# Patient Record
Sex: FEMALE | Race: WHITE | NOT HISPANIC OR LATINO | Employment: FULL TIME | URBAN - METROPOLITAN AREA
[De-identification: names, ages, dates, MRNs, and addresses within clinical notes are randomized per-mention and may not be internally consistent; named-entity substitution may affect disease eponyms.]

---

## 2024-09-30 ENCOUNTER — HOSPITAL ENCOUNTER (OUTPATIENT)
Dept: RADIOLOGY | Facility: HOSPITAL | Age: 64
Discharge: HOME | End: 2024-09-30
Payer: COMMERCIAL

## 2024-09-30 ENCOUNTER — OFFICE VISIT (OUTPATIENT)
Dept: ORTHOPEDIC SURGERY | Facility: HOSPITAL | Age: 64
End: 2024-09-30
Payer: COMMERCIAL

## 2024-09-30 VITALS — BODY MASS INDEX: 39.25 KG/M2 | HEIGHT: 67 IN | WEIGHT: 250.1 LBS

## 2024-09-30 DIAGNOSIS — M25.562 LEFT KNEE PAIN, UNSPECIFIED CHRONICITY: ICD-10-CM

## 2024-09-30 DIAGNOSIS — M17.12 PRIMARY OSTEOARTHRITIS OF LEFT KNEE: Primary | ICD-10-CM

## 2024-09-30 PROCEDURE — 2500000004 HC RX 250 GENERAL PHARMACY W/ HCPCS (ALT 636 FOR OP/ED): Performed by: STUDENT IN AN ORGANIZED HEALTH CARE EDUCATION/TRAINING PROGRAM

## 2024-09-30 PROCEDURE — 20610 DRAIN/INJ JOINT/BURSA W/O US: CPT | Mod: LT | Performed by: STUDENT IN AN ORGANIZED HEALTH CARE EDUCATION/TRAINING PROGRAM

## 2024-09-30 PROCEDURE — 99214 OFFICE O/P EST MOD 30 MIN: CPT | Performed by: STUDENT IN AN ORGANIZED HEALTH CARE EDUCATION/TRAINING PROGRAM

## 2024-09-30 PROCEDURE — 2500000005 HC RX 250 GENERAL PHARMACY W/O HCPCS: Performed by: STUDENT IN AN ORGANIZED HEALTH CARE EDUCATION/TRAINING PROGRAM

## 2024-09-30 PROCEDURE — 73564 X-RAY EXAM KNEE 4 OR MORE: CPT | Mod: LT

## 2024-09-30 PROCEDURE — 1036F TOBACCO NON-USER: CPT | Performed by: STUDENT IN AN ORGANIZED HEALTH CARE EDUCATION/TRAINING PROGRAM

## 2024-09-30 PROCEDURE — 3008F BODY MASS INDEX DOCD: CPT | Performed by: STUDENT IN AN ORGANIZED HEALTH CARE EDUCATION/TRAINING PROGRAM

## 2024-09-30 PROCEDURE — 1159F MED LIST DOCD IN RCRD: CPT | Performed by: STUDENT IN AN ORGANIZED HEALTH CARE EDUCATION/TRAINING PROGRAM

## 2024-09-30 RX ORDER — TRIAMCINOLONE ACETONIDE 40 MG/ML
80 INJECTION, SUSPENSION INTRA-ARTICULAR; INTRAMUSCULAR
Status: COMPLETED | OUTPATIENT
Start: 2024-09-30 | End: 2024-09-30

## 2024-09-30 RX ORDER — CHOLECALCIFEROL (VITAMIN D3) 25 MCG
1000 TABLET ORAL
COMMUNITY

## 2024-09-30 RX ORDER — BUPIVACAINE HYDROCHLORIDE 5 MG/ML
4 INJECTION, SOLUTION PERINEURAL
Status: COMPLETED | OUTPATIENT
Start: 2024-09-30 | End: 2024-09-30

## 2024-09-30 RX ORDER — LIDOCAINE HYDROCHLORIDE 10 MG/ML
8 INJECTION, SOLUTION INFILTRATION; PERINEURAL
Status: COMPLETED | OUTPATIENT
Start: 2024-09-30 | End: 2024-09-30

## 2024-09-30 ASSESSMENT — ENCOUNTER SYMPTOMS
DEPRESSION: 0
LOSS OF SENSATION IN FEET: 0
OCCASIONAL FEELINGS OF UNSTEADINESS: 0

## 2024-09-30 ASSESSMENT — PAIN DESCRIPTION - DESCRIPTORS: DESCRIPTORS: ACHING;THROBBING

## 2024-09-30 ASSESSMENT — PAIN - FUNCTIONAL ASSESSMENT: PAIN_FUNCTIONAL_ASSESSMENT: 0-10

## 2024-09-30 ASSESSMENT — PAIN SCALES - GENERAL: PAINLEVEL_OUTOF10: 5 - MODERATE PAIN

## 2024-09-30 NOTE — PROGRESS NOTES
Kalpana Cespedes MD   Adult Reconstruction and Joint Replacement Surgery  Phone: [phone]     Fax: [phone]       Name: Mary Coughlin  Age: 63 y.o.   : 1960   Date of Visit: 2024    INITIAL CONSULTATION    CC: Left knee pain    Clinical History:  This patient presents with several months of LEFT knee pain, acute worsened in the past 10 days. She reports intermittent diffuse knee pain and calf fullness without swelling that occurs when undergoing exercise that resolves with rest after hours or a day. This has occurred 3-4 times since this summer. This most recent event occurred 10 days ago, with unresolving pains since.    Patient has tried the following Ice, NSAIDs, Tylenol (arthritis dosing) , Activity modification, Physical therapy, Brace , and Xray. Patient does have pain at night. Patient does not report falls related to this problem. Patient is able to walk unlimited blocks. Patient is currently using crutches as assistive device. Primarily complains of diffuse pain. Patient has difficulty with climbing stairs, descending stairs, walking , getting up from a chair, prolonged sitting , and walking on unlevel surfaces . The pain is significantly impacting their ability to perform activities of daily living. Patient reports no longer able to do activities such as work, biking, going on walks.     Focused History  PMH: Reviewed and PE/DVT: none  PSH: Reviewed , Hip/Knee replacement: no, Hip/Knee surgery: yes, MPFL recon + lateral release R knee, Anesthesia complications: no, Spine surgery: no, Surgical infection: no, and Weight loss surgery: no  Meds: Reviewed, Current Anticoagulants: no, Weight loss medication: no, and Current Opioids: no  Allergies: Reviewed  and The patient reports no contraindications or allergies to cephalosporins, aspirin, NSAIDs or opioids, except as noted above.  FH: No family history of any bleeding or clotting disorders.  SHx: Reviewed, Occupation: fire place  store owner, Current smoker: no, EtOH intake weekly: 1-2 , Social support: , and Preferred physical activities: biking, walking  Dental Hx: Last routine cleanin year ago and Discussed that all invasive dental work must be completed at least 3 months prior to joint replacement surgery. Patient understands they are to avoid any invasive dental work 3-6 months post-surgically.   Jehovah´s Witness: no    PROMs/HISTORY  PROMs   No questionnaires on file.     Past Medical History:   Diagnosis Date    Personal history of other (healed) physical injury and trauma 2015    History of dislocation of knee       Past Medical History:   Diagnosis Date    Personal history of other (healed) physical injury and trauma 2015    History of dislocation of knee     Documented in chart and reviewed.     Past Surgical History:   Procedure Laterality Date    KNEE ARTHROSCOPY W/ DEBRIDEMENT  2016    Knee Arthroscopy (Therapeutic)       Allergies: She is allergic to prochlorperazine and sulfa (sulfonamide antibiotics).     Medications:  Current Outpatient Medications   Medication Instructions    cholecalciferol (VITAMIN D-3) 1,000 Units, oral, Daily RT       No family history on file.  Documented in chart and reviewed.     Social History     Tobacco Use    Smoking status: Former     Types: Cigarettes    Smokeless tobacco: Never   Substance Use Topics    Alcohol use: Not on file        Review of Systems: Review of systems completed with medical assistant intake. Please refer to this note.     Physical Exam:  BMI: 39.    General: The patient is well appearing and has an appropriate affect.     Neurological Examination: SILT in SPN/DPN/Sural/Saphenous/Tibial nerves. 5/5 EHL, FHL, Tibial anterior, Gastrocnemius. Coordination grossly intact.     Cardiovascular Exam: Capillary refill <2 seconds.     Lymphatic Examination: There is no obvious lymphatic swelling present around the involved joint.    Skin Exam: Skin around  the pertinent joint is without evidence of infection or rash.    Gait: The patient ambulates with an antalgic gait.     Right Hip Examination:  The skin is intact over the hip.    There is no tenderness over the greater trochanter.    Range of motion is full extension to 100 degrees of flexion.    The hip is stable without subluxation or dislocation.    The hip internally rotates to 15 degrees and externally rotates to 45 degrees.    There is no pain with hip motion.    Left Hip Examination:  The skin is intact over the hip.    There is no tenderness over the greater trochanter.    Range of motion is full extension to 100 degrees of flexion.    The hip is stable without subluxation or dislocation.    The hip internally rotates to 15 degrees and externally rotates to 45 degrees.    There is no pain with hip motion.    Left Knee Examination:  Examination of the left knee reveals the skin to be intact.    There is a moderate effusion in the knee.    The alignment of the knee is Varus.    This deformity is correctable.    There is tenderness to palpation over the joint line.    There is significant quadriceps atrophy.    Range of Motion: full extension to 115 degrees of flexion.    The knee is stable to varus-valgus stress and anterior-posterior stress.     There is mild grinding with range of motion.    There is moderate patellofemoral crepitus.    Patella tracks midline to slightly laterally deviated, but does not dislocate.  No J sign.  Patella translates 1-2 quadrants medial lateral without apprehension.  Minimal tenderness.  Positive patellar grind.    Right Knee Examination:  Examination of the right knee reveals the skin to be intact.     There is no obvious swelling.    There is a no effusion in the knee.     The alignment of the knee is normal.    There is no tenderness to palpation over the joint line.    There is no significant quadriceps atrophy.    Range of motion is full extension to 120 degrees of  "flexion.    The knee is stable to varus-valgus stress and anterior-posterior stress.     There is mild grinding with range of motion.    There is moderate patellofemoral crepitus.    Prior Labs:   Prior Labs:   No results found for: \"WBC\", \"HGB\", \"HCT\", \"MCV\", \"PLT\"   No results found for: \"INR\", \"PROTIME\"      No results found for: \"GLUCOSE\", \"CALCIUM\", \"NA\", \"K\", \"CO2\", \"CL\", \"BUN\", \"CREATININE\"   No results found for: \"CKTOTAL\", \"CKMB\", \"CKMBINDEX\", \"TROPONINI\"   No results found for: \"HGBA1C\"      No results found for: \"CRP\"   No results found for: \"SEDRATE\"      Radiographs:  Radiographs were personally reviewed today. There is evidence of severe LEFT knee osteoarthritis with near PATELLOFEMORAL bone on bone apposition.  Mild osteoarthritis involving the medial compartment.    Impression:  This patient presents with severe LEFT knee osteoarthritis with near bone on bone apposition. The patient is not a candidate for surgery at this time.    Diagnosis:  Primary osteoarthritis of left knee    Left knee pain, unspecified chronicity     Recommendations / Plan:    I have discussed the options in detail with the patient. We have discussed anti-inflammatory medication, activity modification, physical therapy, corticosteroid injections, viscosupplementation injections, partial knee replacement surgery and total knee replacement surgery. The patient has not yet exhausted all conservative treatment measures.    The risks and benefits of all these treatment options have been discussed in detail.     The patient has tried at least 3 months of the above conservative treatments and continues to have disabling pain, impaired activities of daily living and worsened quality of life.  Reviewed the surgical optimization steps to optimize their chances for a successful joint replacement surgery.      Currently their BMI is 39.  Discussed that obesity is a risk factor for continued progression of osteoarthritis. Each pound of " weight loss offloads their hip and knee joints by 3-6 pounds.  The most effective of these options is weight loss mainly through restricting caloric intake.     A physical therapy prescription was ordered for the patient.  Patient will continue their home exercise program. Strategies for pain management using over-the-counter anti-inflammatory medications reviewed -ibuprofen discussed.  The patient elects for a steroid injection, which was provided according to procedure note below.  Patellar band discussed.  Encouraged them to maintain range of motion and strength around the knee joints.  They will continue to implement these strategies in addressing their pain.      Recommend the patient continue optimizing nonsurgical treatment interventions as outlined above for management of their knee arthritis.  I would be happy to see them again at any point to discuss surgery if they are more optimized or to review progress with nonsurgical treatment of arthritis. The patient verbalizes understanding with the recommendations and treatment plan as outlined above and is in agreement.  Questions were addressed.    Large Joint Injection 20610: Knee  Consent given by: Patient  Timeout: Immediately prior to procedure the correct patient, procedure, and site was verified. Sterile gloves and semi-sterile technique were used.   Indications: Knee pain and joint swelling.   Location: LEFT knee  Needle size: 22 G  Approach: Lateral    Medications administered: please see medical assistant note for lot number and expiration date.   Subcutaneous   4 ml of 1% lidocaine     Deep   4 ml of 1% lidocaine   4 mL 0.5% marcaine   2 mL of 40 mg kenalog     Patient tolerance: Dressing applied. Patient tolerated the procedure well with no immediate complications.    L Inj/Asp: L knee on 9/30/2024 9:37 AM  Indications: pain and joint swelling  Details: 22 G needle, lateral approach  Medications: 80 mg triamcinolone acetonide 40 mg/mL; 8 mL lidocaine  10 mg/mL (1 %); 4 mL BUPivacaine HCl 0.5 % (5 mg/mL)  Outcome: tolerated well, no immediate complications  Procedure, treatment alternatives, risks and benefits explained, specific risks discussed. Consent was given by the patient. Immediately prior to procedure a time out was called to verify the correct patient, procedure, equipment, support staff and site/side marked as required. Patient was prepped and draped in the usual sterile fashion.         _____________  Kalpana Cespedes MD   Attending Orthopaedic Surgeon  [facility]    [facility]     This office note was transcribed with dictation software.  Please excuse any typographical errors, program misunderstandings leading to inadvertent insertions or deletions of inappropriate wording, pronoun errors and other unintentional transcription errors not noticed on proof-reading.

## 2024-10-14 ENCOUNTER — HOSPITAL ENCOUNTER (OUTPATIENT)
Dept: RADIOLOGY | Facility: CLINIC | Age: 64
Discharge: HOME | End: 2024-10-14
Payer: COMMERCIAL

## 2024-10-14 ENCOUNTER — TRANSCRIBE ORDERS (OUTPATIENT)
Dept: RADIOLOGY | Facility: CLINIC | Age: 64
End: 2024-10-14

## 2024-10-14 ENCOUNTER — APPOINTMENT (OUTPATIENT)
Dept: OBSTETRICS AND GYNECOLOGY | Facility: CLINIC | Age: 64
End: 2024-10-14
Payer: COMMERCIAL

## 2024-10-14 DIAGNOSIS — N95.0 PMB (POSTMENOPAUSAL BLEEDING): ICD-10-CM

## 2024-10-14 DIAGNOSIS — N95.0 PMB (POSTMENOPAUSAL BLEEDING): Primary | ICD-10-CM

## 2024-10-14 PROCEDURE — 1036F TOBACCO NON-USER: CPT | Performed by: OBSTETRICS & GYNECOLOGY

## 2024-10-14 PROCEDURE — 99204 OFFICE O/P NEW MOD 45 MIN: CPT | Performed by: OBSTETRICS & GYNECOLOGY

## 2024-10-14 PROCEDURE — 1159F MED LIST DOCD IN RCRD: CPT | Performed by: OBSTETRICS & GYNECOLOGY

## 2024-10-14 PROCEDURE — 76830 TRANSVAGINAL US NON-OB: CPT | Performed by: OBSTETRICS & GYNECOLOGY

## 2024-10-14 RX ORDER — MISOPROSTOL 200 UG/1
200 TABLET ORAL ONCE
Qty: 1 TABLET | Refills: 0 | Status: SHIPPED
Start: 2024-10-14 | End: 2024-10-15 | Stop reason: WASHOUT

## 2024-10-14 NOTE — PROGRESS NOTES
Pt c/o VB yesterday - 2 episodes  No recent intercourse  No prior h/o of PMB    Last pap 2016 normal  Last HPV 2016 negative    Last mammogram 2020 normal    PE  EGBUS normal  Vagina blood in vault  Cervix no lesions, stenotic    Unable to obtain EMB due to cervical stenosis    Pelvic US showed EMS 8mm, fluid in cavity    A/P: PMB. Thickened endometrium on US. Cervical stenosis  Discussed with patient option of hysteroscopy D&C in the operating room versus repeat attempt for endometrial biopsy after Cytotec and under ultrasound guidance with cervical dilators.  Patient opts for attempted office endometrial biopsy.  Will schedule

## 2024-10-15 DIAGNOSIS — N95.0 PMB (POSTMENOPAUSAL BLEEDING): Primary | ICD-10-CM

## 2024-10-15 RX ORDER — MISOPROSTOL 200 UG/1
200 TABLET ORAL ONCE
Qty: 1 TABLET | Refills: 0 | Status: SHIPPED | OUTPATIENT
Start: 2024-10-15 | End: 2024-10-15

## 2024-11-04 ENCOUNTER — APPOINTMENT (OUTPATIENT)
Dept: RADIOLOGY | Facility: CLINIC | Age: 64
End: 2024-11-04
Payer: MEDICARE

## 2024-11-04 ENCOUNTER — APPOINTMENT (OUTPATIENT)
Dept: OBSTETRICS AND GYNECOLOGY | Facility: CLINIC | Age: 64
End: 2024-11-04
Payer: MEDICARE

## 2024-11-04 DIAGNOSIS — N95.0 PMB (POSTMENOPAUSAL BLEEDING): ICD-10-CM

## 2024-11-04 DIAGNOSIS — N95.0 PMB (POSTMENOPAUSAL BLEEDING): Primary | ICD-10-CM

## 2024-11-04 PROCEDURE — 88305 TISSUE EXAM BY PATHOLOGIST: CPT | Performed by: PATHOLOGY

## 2024-11-04 PROCEDURE — 88305 TISSUE EXAM BY PATHOLOGIST: CPT

## 2024-11-04 PROCEDURE — 58100 BIOPSY OF UTERUS LINING: CPT | Performed by: OBSTETRICS & GYNECOLOGY

## 2024-11-04 NOTE — PROGRESS NOTES
Procedure: Endometrial Biopsy    Patient Declined chaperone   Procedure indication:  Postmenopausal bleeding    Risks, benefits and alternatives were discussed with the patient. We discussed possible complications and risks. Written consent was obtained prior to the procedure and is detailed in the patient's record.    Pregnancy Test: Pregnancy test not indicated    Local anesthesia: No  Tenaculum applied: Anterior tenaculum  Cervical dilation: No.  Patient took Cytotec the night before  Ultrasound guided  The endometrial biopsy was completed with an adequate sample obtained .      The patient tolerated the procedure well and had minimal bleeding noted at the conclusion of the procedure.    Sample sent to  Pathology    Sherlyn Brunson  11/04/2024  1:58 PM

## 2024-11-07 DIAGNOSIS — N95.0 PMB (POSTMENOPAUSAL BLEEDING): Primary | ICD-10-CM

## 2024-11-07 LAB
LABORATORY COMMENT REPORT: NORMAL
PATH REPORT.FINAL DX SPEC: NORMAL
PATH REPORT.GROSS SPEC: NORMAL
PATH REPORT.RELEVANT HX SPEC: NORMAL
PATH REPORT.TOTAL CANCER: NORMAL
RESIDENT REVIEW: NORMAL

## 2024-11-07 RX ORDER — MEDROXYPROGESTERONE ACETATE 5 MG/1
TABLET ORAL
Qty: 90 TABLET | Refills: 0 | Status: SHIPPED | OUTPATIENT
Start: 2024-11-07

## 2024-11-08 ENCOUNTER — EVALUATION (OUTPATIENT)
Dept: PHYSICAL THERAPY | Facility: CLINIC | Age: 64
End: 2024-11-08
Payer: MEDICARE

## 2024-11-08 DIAGNOSIS — G89.29 CHRONIC PAIN OF LEFT KNEE: Primary | ICD-10-CM

## 2024-11-08 DIAGNOSIS — M25.562 CHRONIC PAIN OF LEFT KNEE: Primary | ICD-10-CM

## 2024-11-08 DIAGNOSIS — M17.12 PRIMARY OSTEOARTHRITIS OF LEFT KNEE: ICD-10-CM

## 2024-11-08 PROCEDURE — 97110 THERAPEUTIC EXERCISES: CPT | Mod: GP

## 2024-11-08 PROCEDURE — 97161 PT EVAL LOW COMPLEX 20 MIN: CPT | Mod: GP

## 2024-11-08 ASSESSMENT — ENCOUNTER SYMPTOMS
LOSS OF SENSATION IN FEET: 0
OCCASIONAL FEELINGS OF UNSTEADINESS: 0
DEPRESSION: 0

## 2024-11-08 NOTE — PROGRESS NOTES
Physical Therapy Evaluation    Patient Name: Mary Coughlin  MRN: [id number]  Today's Date: 11/8/2024  Time Calculation  Start Time: 0949  Stop Time: 1035  Time Calculation (min): 46 min  PT Evaluation Time Entry  PT Evaluation (Low) Time Entry: 20  PT Therapeutic Procedures Time Entry  Therapeutic Exercise Time Entry: 25        Insurance: Medicare  Plan of Care: 11/8/24 to 2/6/25  Visit #1    Referring MD Kalpana Cespedes  Imaging Performed X-rays showed mild tricompartmental OA B knees    Assessment     Mary Coughlin is a 64 y.o. referred for L knee pain and some lingering hamstring pain from an old injury. Patient demonstrates decreased L knee extension ROM, decreased LLE strength, moderate knee swelling on L, altered gait mechanics, and pain. At this time, patient is limited with walking, exercising, negotiating steps, sleeping. Patient will benefit from physical therapy services to address stated impairments and improve functional mobility.    Plan  Treatment/Interventions: Cryotherapy, Education/ Instruction, Gait training, Hot pack, Manual therapy, Neuromuscular re-education, Self care/ home management, Therapeutic activities, Therapeutic exercises  PT Plan: Skilled PT  PT Frequency: 1 time per week  Duration: 10 weeks  Onset Date: 03/01/24  Certification Period Start Date: 11/08/24  Certification Period End Date: 02/06/25  Number of Treatments Authorized: 40  Rehab Potential: Good  Plan of Care Agreement: Patient    Primary diagnosis: L knee pain  Current Problem  1. Chronic pain of left knee  Follow Up In Physical Therapy      2. Primary osteoarthritis of left knee  Referral to Physical Therapy          General:  General  Reason for Referral: L knee pain  Referred By: Kalpana Cespedes  Past Medical History Relevant to Rehab: L hamstring injury a few years ago, R patellar dislocations with surgical correction 20+ years ago  Preferred Learning Style: verbal, visual,  written  Precautions:  Precautions  STEADI Fall Risk Score (The score of 4 or more indicates an increased risk of falling): 0  Precautions Comment: None  Vital Signs:       Subjective:  Chief complaints: L knee pain, swelling; occasional L proximal hamstring pain   Onset/Surgery Date: 6-8 months ago  Mechanism of Injury: Insidious onset  Previous History: R knee surgery due to frequent dislocations, L hamstring injury a few years ago  Personal Factors that may impact care: None     Pain:  Current: 0/10 Best: 0/10 Worst: 3/10   Location: L knee   Type: ache   Aggravators: walking normally or more than 3/4 mile, too much activity, steps   Alleviators: salonpas, cortisone injection, ice, elevation   Numbness/tingling? No    Function:   Work/Recreation: Yes, own fireplace shop   Prior Level: Full   Current limitations: walking, steps, exercising   Condition: Improving     [place] Situation[place]   Stairs: basement laundry   Lives with    No concerns about home set up    Any falls in the past year No     Injuries? N/a    Fear of falling? No    Sleep:    Getting to sleep No   Disturbed Yes, rolling over and lying on R side was bothersome but doing better now   Preferred position(s): Back, side      Goals for Therapy:    Get strength and flexibility back, walking a few miles    Objective   Palpation: mild to mod crepitus under patella on L     ROM/Flexibility:    Knee Flexion R / L :      132 / 132    Knee Extension R / L :  0 / (3)      Hip ER R / L: 28 / 25    Hip IR R / L: 25 / 28        Hamstring R / L : (23) / (20)     Girth Measurements/Swelling :    Mid patellar R / L : 43 cm / 42 cm    Stroke Test R / L : 1+ / 2+     Strength R / L :    Hip Flexion:     5 / 5    Hip Extension:     4 / 4    Hip Abduction:    5 / 4+    Hip Adduction:    5 / 5    Hip ER:      5/ 4+    Hip IR:         5 / 5    Knee Extension:   5 / 4+    Knee Flexion:       5 / 4    Ankle DF:             5 / 5    Ankle PF:              5 /  5     Neurological: Pt endorses intact/symmetrical BLE sensation     Gait: Decreased L hip extension during gait     Balance: 30 sec SLS B, more unsteadiness in ankle on L     Special Tests R / L :     Anterior Drawer:    - / -    Lachman:               - / -     Posterior Drawer:   - / -     Posterior Sag:         - / -    Varus @ 0` :            - / -    Varus @ 30` :          - / -    Valgus @ 0` :          - / -    Valgus @ 30` :        - / -     Fabi:             - / -      Outcome Measure:    LEFS : 43 / 80      Treatment:  Access Code: [email or web address]  URL: [email or web address]  Date: 11/08/2024  Prepared by: Laura Arshad    Exercises  - Supine Quad Set  - 2 x daily - 7 x weekly - 1 sets - 20 reps - 5 second hold  - Supine Knee Extension Strengthening  - 2 x daily - 7 x weekly - 1 sets - 20 reps - 5 second hold  - Active Straight Leg Raise  - 2 x daily - 7 x weekly - 2 sets - 10 reps  - Supine Hamstring Swiss Ball Curls/Dynamic Mobilization  - 2 x daily - 7 x weekly - 1 sets - 20 reps  - Supine Bridge  - 2 x daily - 7 x weekly - 2 sets - 10 reps - 5 second hold    Goals:  Active       PT Problem       Patient will be independent with home exercise program for home maintenance.        Start:  11/08/24    Expected End:  12/08/24            Pt will increase L knee extension to 0 to facilitate full extension during gait.        Start:  11/08/24    Expected End:  12/08/24            The patient will improve score on LEFS by 10 points to indicate decreased pain and increased functional level.         Start:  11/08/24    Expected End:  01/07/25            Pt will report being able to bike or swim for 20 minutes without reactive knee swelling or pain to facilitate progression of activity level.        Start:  11/08/24    Expected End:  01/07/25

## 2024-11-22 ENCOUNTER — TREATMENT (OUTPATIENT)
Dept: PHYSICAL THERAPY | Facility: CLINIC | Age: 64
End: 2024-11-22
Payer: MEDICARE

## 2024-11-22 DIAGNOSIS — G89.29 CHRONIC PAIN OF LEFT KNEE: ICD-10-CM

## 2024-11-22 DIAGNOSIS — M25.562 CHRONIC PAIN OF LEFT KNEE: ICD-10-CM

## 2024-11-22 PROCEDURE — 97110 THERAPEUTIC EXERCISES: CPT | Mod: GP

## 2024-11-22 NOTE — PROGRESS NOTES
"Physical Therapy    Physical Therapy Treatment    Patient Name: Mary Coughlin  MRN: [id number]  Today's Date: 11/22/2024    Time Entry:   Time Calculation  Start Time: 1045  Stop Time: 1125  Time Calculation (min): 40 min     PT Therapeutic Procedures Time Entry  Therapeutic Exercise Time Entry: 40                 Insurance: Medicare  Certification period: 11/8/24  to 2/6/25  Visit Count: 2    Assessment:   Pt able to add some additional hip strengthening on the table without sx, but did have some increased pain/sx with weightbearing exercises.     Plan:   SAQ/LAQ    Current Problem  1. Chronic pain of left knee  Follow Up In Physical Therapy          Subjective    Feels more confident with the knee, like it's improved some since starting the exercises.   Precautions   None  Pain   0/10 initially    Objective   Normal appearing gait  Sharp pain anterior L knee with repetitions of step up    Treatments:  Therapeutic Exercise   Active hamstring stretch x 20 L   Bridge w/R kickout x 10   DKTC x 20   Sidelying clam 3\" x 20 L  Seated hip ER x 20 (red)   Ccamb 5 plates x 5 ea retro and forward   6\" step up - created pain on 8th rep   DL calf raises x 20   Lat amb x 3 laps (black above knees) - some medial knee soreness   SLR 2 x 10 ea    Bridge 5\" x 10         OP EDUCATION:       Goals:  Active       PT Problem       Patient will be independent with home exercise program for home maintenance.        Start:  11/08/24    Expected End:  12/08/24            Pt will increase L knee extension to 0 to facilitate full extension during gait.        Start:  11/08/24    Expected End:  12/08/24            The patient will improve score on LEFS by 10 points to indicate decreased pain and increased functional level.         Start:  11/08/24    Expected End:  01/07/25            Pt will report being able to bike or swim for 20 minutes without reactive knee swelling or pain to facilitate progression of activity level.        Start:  " 11/08/24    Expected End:  01/07/25

## 2024-12-04 ENCOUNTER — TREATMENT (OUTPATIENT)
Dept: PHYSICAL THERAPY | Facility: CLINIC | Age: 64
End: 2024-12-04
Payer: MEDICARE

## 2024-12-04 DIAGNOSIS — M25.562 CHRONIC PAIN OF LEFT KNEE: ICD-10-CM

## 2024-12-04 DIAGNOSIS — G89.29 CHRONIC PAIN OF LEFT KNEE: ICD-10-CM

## 2024-12-04 PROCEDURE — 97110 THERAPEUTIC EXERCISES: CPT | Mod: GP

## 2024-12-04 NOTE — PROGRESS NOTES
"Physical Therapy    Physical Therapy Treatment    Patient Name: Mary Coughlin  MRN: [id number]  Today's Date: 12/5/2024    Time Entry:   Time Calculation  Start Time: 1625  Stop Time: 1708  Time Calculation (min): 43 min     PT Therapeutic Procedures Time Entry  Therapeutic Exercise Time Entry: 30              Non-Billable Time  Non-billable co-treatment time: 10  Insurance: Medicare  Certification period: 11/8/24  to 2/6/25  Visit Count: 3    Assessment:   Able to do lat amb without knee pain today, which she was unable to do last week. Showing improvements, but needs further strength in hamstring, hip, quad.     Plan:   Could try some machine work, 4\" step up as able    Current Problem  1. Chronic pain of left knee  Follow Up In Physical Therapy            Subjective    Still gets some sharp moments of pain, but overall still seeing improvement  Precautions   None  Pain   0/10 initially    Objective   Normal appearing gait  Sharp pain anterior L knee with repetitions of step up    Treatments:  Therapeutic Exercise   Upright bike x 6 min   L HS curl on pball x 20   Bridge 5\" x 10   Bridge w/R kickout x 10   Sidelying clam 3\" x 20 L   SLR 2# 2 x 10 ea   Seated hip ER x 20 (red)   Ccamb 5 plates x 5 ea retro and forward   Lat amb x 2 laps (red above knees)        OP EDUCATION:       Goals:  Active       PT Problem       Patient will be independent with home exercise program for home maintenance.        Start:  11/08/24    Expected End:  12/08/24            Pt will increase L knee extension to 0 to facilitate full extension during gait.        Start:  11/08/24    Expected End:  12/08/24            The patient will improve score on LEFS by 10 points to indicate decreased pain and increased functional level.         Start:  11/08/24    Expected End:  01/07/25            Pt will report being able to bike or swim for 20 minutes without reactive knee swelling or pain to facilitate progression of activity level.        " Start:  11/08/24    Expected End:  01/07/25

## 2024-12-12 ENCOUNTER — APPOINTMENT (OUTPATIENT)
Dept: PHYSICAL THERAPY | Facility: CLINIC | Age: 64
End: 2024-12-12
Payer: MEDICARE

## 2024-12-19 ENCOUNTER — TREATMENT (OUTPATIENT)
Dept: PHYSICAL THERAPY | Facility: CLINIC | Age: 64
End: 2024-12-19
Payer: MEDICARE

## 2024-12-19 DIAGNOSIS — M25.562 CHRONIC PAIN OF LEFT KNEE: ICD-10-CM

## 2024-12-19 DIAGNOSIS — G89.29 CHRONIC PAIN OF LEFT KNEE: ICD-10-CM

## 2024-12-19 PROCEDURE — 97110 THERAPEUTIC EXERCISES: CPT | Mod: GP,CQ

## 2024-12-19 NOTE — PROGRESS NOTES
"Physical Therapy    Physical Therapy Treatment    Patient Name: Mary Coughlin  MRN: [id number]  Today's Date: 12/19/2024    Time Entry:   Time Calculation  Start Time: 0513  Stop Time: 0601  Time Calculation (min): 48 min     PT Therapeutic Procedures Time Entry  Therapeutic Exercise Time Entry: 40                 Insurance: Medicare  Certification period: 11/8/24  to 2/6/25  Visit Count: 4    Assessment:  Pt continues to show progress.  Attempted BOSU sit to stand, but pt felt crepitus and reported that that sx usually ends with increased pain, therefore held.  Pt needs further strength in hamstring, hip, quad.     Plan:   Could try some machine work, 4\" step up as able. SLR in ER, butt taps to wall    Current Problem  1. Chronic pain of left knee  Follow Up In Physical Therapy            Subjective    \"Random ADLs  Precautions   None  Pain   0/10 initially    Objective   Normal appearing gait  Sharp pain anterior L knee with repetitions of step up    Treatments:  Therapeutic Exercise  QS x 20, L  Bridge w/ R kick out 2x10  SLR 2x10 ea, #2  L clams x 20, RTB  L HSC on Pball x 20   Upright bike x 8 min  Ccamb 5 plates x 10 ea retro and forward   Lat amb x 5 laps (red above knees)        OP EDUCATION:       Goals:  Active       PT Problem       Patient will be independent with home exercise program for home maintenance.        Start:  11/08/24    Expected End:  12/08/24            Pt will increase L knee extension to 0 to facilitate full extension during gait.        Start:  11/08/24    Expected End:  12/08/24            The patient will improve score on LEFS by 10 points to indicate decreased pain and increased functional level.         Start:  11/08/24    Expected End:  01/07/25            Pt will report being able to bike or swim for 20 minutes without reactive knee swelling or pain to facilitate progression of activity level.        Start:  11/08/24    Expected End:  01/07/25              "

## 2025-01-03 ENCOUNTER — APPOINTMENT (OUTPATIENT)
Dept: PHYSICAL THERAPY | Facility: CLINIC | Age: 65
End: 2025-01-03
Payer: MEDICARE

## 2025-01-15 ENCOUNTER — TREATMENT (OUTPATIENT)
Dept: PHYSICAL THERAPY | Facility: CLINIC | Age: 65
End: 2025-01-15
Payer: MEDICARE

## 2025-01-15 DIAGNOSIS — M25.562 CHRONIC PAIN OF LEFT KNEE: ICD-10-CM

## 2025-01-15 DIAGNOSIS — G89.29 CHRONIC PAIN OF LEFT KNEE: ICD-10-CM

## 2025-01-15 PROCEDURE — 97110 THERAPEUTIC EXERCISES: CPT | Mod: GP

## 2025-01-15 NOTE — PROGRESS NOTES
"Physical Therapy    Physical Therapy Treatment    Patient Name: Mary Coughlin  MRN: [id number]  Today's Date: 1/16/2025    Time Entry:   Time Calculation  Start Time: 1612  Stop Time: 1703  Time Calculation (min): 51 min     PT Therapeutic Procedures Time Entry  Therapeutic Exercise Time Entry: 45                 Insurance: Medicare  Certification period: 11/8/24  to 2/6/25  Visit Count: 5    Assessment:  Tolerated machine work well, just fatigued in the thighs and some discomfort in the knees. Putting off step work as it is still painful for her at home.     Plan:   STS from elevated table or chair + foams    Current Problem  1. Chronic pain of left knee  Follow Up In Physical Therapy            Subjective    \"Sharp pain after kneeling in [employer] on Gem day and pushing a tile down at work.\"  Precautions   None  Pain   0/10 initially    Objective   Normal appearing gait  Sharp pain anterior L knee with repetitions of step up    Treatments:  Therapeutic Exercise  PPT 5 sec x 10  LTR x 10  SKC 5 sec x 5   DKC x 15 sec  SLR 2x10 ea, #2  Ccamb 5 plates x 10 ea retro and forward  Lat amb x 5 laps (red above knees)  DL calf raises 2 x 10  DL HS curl 33# 2 x 10, 5/2  Leg press DL 80# 2 x 10    Not today:  Bridge w/ R kick out 2x10  L clams x 20, RTB  L HSC on Pball x 20   OP EDUCATION:       Goals:  Active       PT Problem       Patient will be independent with home exercise program for home maintenance.        Start:  11/08/24    Expected End:  12/08/24            Pt will increase L knee extension to 0 to facilitate full extension during gait.        Start:  11/08/24    Expected End:  12/08/24            The patient will improve score on LEFS by 10 points to indicate decreased pain and increased functional level.         Start:  11/08/24    Expected End:  01/07/25            Pt will report being able to bike or swim for 20 minutes without reactive knee swelling or pain to facilitate progression of activity level.   "      Start:  11/08/24    Expected End:  01/07/25

## 2025-01-31 ENCOUNTER — TREATMENT (OUTPATIENT)
Dept: PHYSICAL THERAPY | Facility: CLINIC | Age: 65
End: 2025-01-31
Payer: MEDICARE

## 2025-01-31 DIAGNOSIS — G89.29 CHRONIC PAIN OF LEFT KNEE: ICD-10-CM

## 2025-01-31 DIAGNOSIS — M25.562 CHRONIC PAIN OF LEFT KNEE: ICD-10-CM

## 2025-01-31 PROCEDURE — 97110 THERAPEUTIC EXERCISES: CPT | Mod: GP

## 2025-01-31 NOTE — PROGRESS NOTES
"Physical Therapy    Physical Therapy Treatment    Patient Name: Mary Coughlin  MRN: [id number]  Today's Date: 1/31/2025    Time Entry:   Time Calculation  Start Time: 0930  Stop Time: 1020  Time Calculation (min): 50 min     PT Therapeutic Procedures Time Entry  Therapeutic Exercise Time Entry: 42              Non-Billable Time  Non-billable co-treatment time: 8  Insurance: Medicare  Certification period: 11/8/24  to 2/6/25  Visit Count: 6    Assessment:  Good quad fatigue from SLS with knee flexed.     Plan:   STS from elevated table or chair + foams    Current Problem  1. Chronic pain of left knee  Follow Up In Physical Therapy            Subjective    \"2 episodes of sharp pain since I was here last, but it's okay\"  Precautions   None  Pain   0/10 initially    Objective   Normal appearing gait    Treatments:  Therapeutic Exercise  HS curl on pball x 20  SLR 2x10 ea, #2  Clamshells x 20 L   Ccamb 6 plates x 10 ea retro and forward  Butt taps 2 x 10  DL HS curl 33# 2 x 10, 5/2  Leg press DL 85# 2 x 10  Lat amb x 5 laps (green above knees)  DL calf raises 2 x 10  Steps over hurdles x 5 rounds L  L kickstand stance w/knee flexed - switched to knee flexed SLS w/FTT 10-15 sec x 10 reps    Not today:  Bridge w/ R kick out 2x10  OP EDUCATION:       Goals:  Active       PT Problem       Patient will be independent with home exercise program for home maintenance.        Start:  11/08/24    Expected End:  12/08/24            Pt will increase L knee extension to 0 to facilitate full extension during gait.        Start:  11/08/24    Expected End:  12/08/24            The patient will improve score on LEFS by 10 points to indicate decreased pain and increased functional level.         Start:  11/08/24    Expected End:  01/07/25            Pt will report being able to bike or swim for 20 minutes without reactive knee swelling or pain to facilitate progression of activity level.        Start:  11/08/24    Expected End:  01/07/25 "

## 2025-02-14 ENCOUNTER — TREATMENT (OUTPATIENT)
Dept: PHYSICAL THERAPY | Facility: CLINIC | Age: 65
End: 2025-02-14
Payer: MEDICARE

## 2025-02-14 DIAGNOSIS — M25.562 CHRONIC PAIN OF LEFT KNEE: ICD-10-CM

## 2025-02-14 DIAGNOSIS — G89.29 CHRONIC PAIN OF LEFT KNEE: ICD-10-CM

## 2025-02-14 PROCEDURE — 97110 THERAPEUTIC EXERCISES: CPT | Mod: GP

## 2025-02-14 NOTE — PROGRESS NOTES
"Physical Therapy    Physical Therapy Treatment    Patient Name: Mary Coughlin  MRN: [id number]  Today's Date: 2/14/2025    Time Entry:   Time Calculation  Start Time: 0945  Stop Time: 1028  Time Calculation (min): 43 min     PT Therapeutic Procedures Time Entry  Therapeutic Exercise Time Entry: 38                 Insurance: Medicare  Certification period: 11/8/24  to 2/6/25  Visit Count: 7    Assessment:  Challenged by LSLR with weight. Added SL on leg press, with some patellar discomfort on L.     Plan:   Cont as able    Current Problem  1. Chronic pain of left knee  Follow Up In Physical Therapy            Subjective    \"Stairs are still a problem, to the point where they make me limp a bit\"  Precautions   None  Pain   0/10 initially    Objective   Normal appearing gait    Treatments:  Therapeutic Exercise  HS curl on pball x 20  SLR 2x10 ea, #2  LSLR 3# 2 x 10 L   Ccamb 6 plates x 10 ea retro and forward  Squat tap to BOSU on blue bench 2 x 10  DL HS curl 44# x 10 (5/2)  Lat amb x 5 laps (green above knees)  Leg press # 2 x 10  Leg Press SL 60# 2 x 10 ea  DL calf raises 2 x 15  SLS w/FTT 10-15 sec x 10 reps  Steps over hurdles x 5 rounds L    OP EDUCATION:       Goals:  Active       PT Problem       Patient will be independent with home exercise program for home maintenance.        Start:  11/08/24    Expected End:  12/08/24            Pt will increase L knee extension to 0 to facilitate full extension during gait.        Start:  11/08/24    Expected End:  12/08/24            The patient will improve score on LEFS by 10 points to indicate decreased pain and increased functional level.         Start:  11/08/24    Expected End:  01/07/25            Pt will report being able to bike or swim for 20 minutes without reactive knee swelling or pain to facilitate progression of activity level.        Start:  11/08/24    Expected End:  01/07/25              "

## 2025-02-25 ENCOUNTER — APPOINTMENT (OUTPATIENT)
Dept: PRIMARY CARE | Facility: CLINIC | Age: 65
End: 2025-02-25
Payer: COMMERCIAL

## 2025-02-25 ENCOUNTER — OFFICE VISIT (OUTPATIENT)
Dept: PRIMARY CARE | Facility: CLINIC | Age: 65
End: 2025-02-25
Payer: MEDICARE

## 2025-02-25 VITALS
WEIGHT: 247.2 LBS | DIASTOLIC BLOOD PRESSURE: 72 MMHG | HEIGHT: 67 IN | TEMPERATURE: 98.2 F | BODY MASS INDEX: 38.8 KG/M2 | OXYGEN SATURATION: 95 % | HEART RATE: 93 BPM | SYSTOLIC BLOOD PRESSURE: 128 MMHG

## 2025-02-25 DIAGNOSIS — R73.09 ELEVATED GLUCOSE: ICD-10-CM

## 2025-02-25 DIAGNOSIS — Z00.00 ROUTINE GENERAL MEDICAL EXAMINATION AT HEALTH CARE FACILITY: Primary | ICD-10-CM

## 2025-02-25 DIAGNOSIS — Z12.31 ENCOUNTER FOR SCREENING MAMMOGRAM FOR BREAST CANCER: ICD-10-CM

## 2025-02-25 DIAGNOSIS — Z13.220 SCREENING FOR HYPERLIPIDEMIA: ICD-10-CM

## 2025-02-25 PROBLEM — H52.4 MYOPIA WITH PRESBYOPIA: Status: ACTIVE | Noted: 2025-02-25

## 2025-02-25 PROBLEM — G56.03 CARPAL TUNNEL SYNDROME ON BOTH SIDES: Status: ACTIVE | Noted: 2025-02-25

## 2025-02-25 PROBLEM — M25.559 ARTHRALGIA OF HIP: Status: ACTIVE | Noted: 2022-09-20

## 2025-02-25 PROBLEM — J34.2 DEVIATED NASAL SEPTUM: Status: ACTIVE | Noted: 2025-02-25

## 2025-02-25 PROBLEM — M65.4 RADIAL STYLOID TENOSYNOVITIS: Status: ACTIVE | Noted: 2017-08-02

## 2025-02-25 PROBLEM — H52.10 MYOPIA WITH PRESBYOPIA: Status: ACTIVE | Noted: 2025-02-25

## 2025-02-25 PROBLEM — H25.10 NUCLEAR SCLEROTIC CATARACT: Status: ACTIVE | Noted: 2025-02-25

## 2025-02-25 PROBLEM — M22.40 CHONDROMALACIA OF PATELLA: Status: ACTIVE | Noted: 2025-02-25

## 2025-02-25 PROBLEM — M79.605 PAIN OF LEFT LOWER EXTREMITY: Status: ACTIVE | Noted: 2022-09-20

## 2025-02-25 PROBLEM — H69.90 DYSFUNCTION OF EUSTACHIAN TUBE: Status: ACTIVE | Noted: 2025-02-25

## 2025-02-25 PROBLEM — H52.13 BILATERAL MYOPIA: Status: ACTIVE | Noted: 2018-10-30

## 2025-02-25 PROBLEM — M25.469 EFFUSION OF KNEE: Status: ACTIVE | Noted: 2025-02-25

## 2025-02-25 PROBLEM — M79.675 PAIN OF GREAT TOE, LEFT: Status: ACTIVE | Noted: 2018-07-20

## 2025-02-25 PROBLEM — L60.0 INGROWING NAIL, LEFT GREAT TOE: Status: ACTIVE | Noted: 2018-07-20

## 2025-02-25 PROBLEM — R39.9 UTI SYMPTOMS: Status: RESOLVED | Noted: 2025-02-25 | Resolved: 2025-02-25

## 2025-02-25 PROBLEM — R03.0 ELEVATED BLOOD PRESSURE READING WITHOUT DIAGNOSIS OF HYPERTENSION: Status: ACTIVE | Noted: 2022-10-03

## 2025-02-25 PROBLEM — R39.9 UTI SYMPTOMS: Status: ACTIVE | Noted: 2025-02-25

## 2025-02-25 PROBLEM — R10.2 PELVIC CRAMPING: Status: ACTIVE | Noted: 2025-02-25

## 2025-02-25 PROBLEM — M19.072 PRIMARY OSTEOARTHRITIS OF LEFT FOOT: Status: ACTIVE | Noted: 2017-08-02

## 2025-02-25 PROCEDURE — 1160F RVW MEDS BY RX/DR IN RCRD: CPT | Performed by: FAMILY MEDICINE

## 2025-02-25 PROCEDURE — G0439 PPPS, SUBSEQ VISIT: HCPCS | Performed by: FAMILY MEDICINE

## 2025-02-25 PROCEDURE — 3008F BODY MASS INDEX DOCD: CPT | Performed by: FAMILY MEDICINE

## 2025-02-25 PROCEDURE — 1036F TOBACCO NON-USER: CPT | Performed by: FAMILY MEDICINE

## 2025-02-25 PROCEDURE — 1170F FXNL STATUS ASSESSED: CPT | Performed by: FAMILY MEDICINE

## 2025-02-25 PROCEDURE — 1159F MED LIST DOCD IN RCRD: CPT | Performed by: FAMILY MEDICINE

## 2025-02-25 ASSESSMENT — ACTIVITIES OF DAILY LIVING (ADL)
DOING_HOUSEWORK: INDEPENDENT
BATHING: INDEPENDENT
MANAGING_FINANCES: INDEPENDENT
GROCERY_SHOPPING: INDEPENDENT
TAKING_MEDICATION: INDEPENDENT
DRESSING: INDEPENDENT

## 2025-02-25 ASSESSMENT — ENCOUNTER SYMPTOMS
OCCASIONAL FEELINGS OF UNSTEADINESS: 0
DEPRESSION: 0
LOSS OF SENSATION IN FEET: 0

## 2025-02-25 ASSESSMENT — PATIENT HEALTH QUESTIONNAIRE - PHQ9
SUM OF ALL RESPONSES TO PHQ9 QUESTIONS 1 AND 2: 0
1. LITTLE INTEREST OR PLEASURE IN DOING THINGS: NOT AT ALL
2. FEELING DOWN, DEPRESSED OR HOPELESS: NOT AT ALL

## 2025-02-25 NOTE — PROGRESS NOTES
"Subjective   Reason for Visit: Mary Coughlin is an 64 y.o. female here for a Medicare Wellness visit.     Past Medical, Surgical, and Family History reviewed and updated in chart.    Reviewed all medications by prescribing practitioner or clinical pharmacist (such as prescriptions, OTCs, herbal therapies and supplements) and documented in the medical record.    HPI  New to the office  Previous PCP moved  Overall in good health  Sees GYN, ortho and optho  No acute concerns today    Patient Care Team:  Jessica Maldonado MD as PCP - General (Family Medicine)     Review of Systems  Negative unless noted in HPI    Objective   Vitals:  /72   Pulse 93   Temp 36.8 °C (98.2 °F) (Temporal)   Ht 1.702 m (5' 7\")   Wt 112 kg (247 lb 3.2 oz)   SpO2 95%   BMI 38.72 kg/m²       Physical Exam  Constitutional:       Appearance: Normal appearance.   HENT:      Head: Normocephalic and atraumatic.      Right Ear: External ear normal.      Left Ear: External ear normal.      Nose: Nose normal.   Eyes:      Extraocular Movements: Extraocular movements intact.      Pupils: Pupils are equal, round, and reactive to light.   Cardiovascular:      Rate and Rhythm: Normal rate and regular rhythm.   Pulmonary:      Effort: Pulmonary effort is normal.      Breath sounds: Normal breath sounds.   Abdominal:      General: Abdomen is flat.      Palpations: Abdomen is soft.   Musculoskeletal:         General: Normal range of motion.      Cervical back: Neck supple.   Skin:     General: Skin is warm.   Neurological:      General: No focal deficit present.      Mental Status: She is alert and oriented to person, place, and time.   Psychiatric:         Mood and Affect: Mood normal.         Assessment & Plan  Routine general medical examination at health care facility  Recommended screening guidelines addressed and orders placed as indicated by age and chronic conditions  Screening labs ordered, will call with results  Mammogram recommended and " order provided  Colonoscopy reported within last 10 years, will obtain records  Declines immunizations  Continue to work on healthy lifestyle including well balanced diet, regular activity, limit alcohol, no tobacco products   Follow up annually    Orders:    Comprehensive Metabolic Panel; Future    BMI 38.0-38.9,adult  Lifestyle modification discussed at length with focus on improving diet and increasing activity levels    Orders:    CBC; Future    Hemoglobin A1C - Lab collect; Future    Elevated glucose    Orders:    Hemoglobin A1C - Lab collect; Future    Encounter for screening mammogram for breast cancer    Orders:    BI mammo bilateral screening tomosynthesis; Future    Screening for hyperlipidemia    Orders:    Lipid Panel; Future

## 2025-02-25 NOTE — ASSESSMENT & PLAN NOTE
Recommended screening guidelines addressed and orders placed as indicated by age and chronic conditions  Screening labs ordered, will call with results  Mammogram recommended and order provided  Colonoscopy reported within last 10 years, will obtain records  Declines immunizations  Continue to work on healthy lifestyle including well balanced diet, regular activity, limit alcohol, no tobacco products   Follow up annually    Orders:    Comprehensive Metabolic Panel; Future

## 2025-03-07 LAB
ALBUMIN SERPL-MCNC: 4.4 G/DL (ref 3.6–5.1)
ALP SERPL-CCNC: 62 U/L (ref 37–153)
ALT SERPL-CCNC: 14 U/L (ref 6–29)
ANION GAP SERPL CALCULATED.4IONS-SCNC: 7 MMOL/L (CALC) (ref 7–17)
AST SERPL-CCNC: 14 U/L (ref 10–35)
BILIRUB SERPL-MCNC: 0.5 MG/DL (ref 0.2–1.2)
BUN SERPL-MCNC: 15 MG/DL (ref 7–25)
CALCIUM SERPL-MCNC: 9.1 MG/DL (ref 8.6–10.4)
CHLORIDE SERPL-SCNC: 104 MMOL/L (ref 98–110)
CHOLEST SERPL-MCNC: 191 MG/DL
CHOLEST/HDLC SERPL: 3.2 (CALC)
CO2 SERPL-SCNC: 28 MMOL/L (ref 20–32)
CREAT SERPL-MCNC: 0.93 MG/DL (ref 0.5–1.05)
EGFRCR SERPLBLD CKD-EPI 2021: 68 ML/MIN/1.73M2
ERYTHROCYTE [DISTWIDTH] IN BLOOD BY AUTOMATED COUNT: 12.2 % (ref 11–15)
EST. AVERAGE GLUCOSE BLD GHB EST-MCNC: 111 MG/DL
EST. AVERAGE GLUCOSE BLD GHB EST-SCNC: 6.2 MMOL/L
GLUCOSE SERPL-MCNC: 103 MG/DL (ref 65–99)
HBA1C MFR BLD: 5.5 % OF TOTAL HGB
HCT VFR BLD AUTO: 46.5 % (ref 35–45)
HDLC SERPL-MCNC: 59 MG/DL
HGB BLD-MCNC: 15.2 G/DL (ref 11.7–15.5)
LDLC SERPL CALC-MCNC: 102 MG/DL (CALC)
MCH RBC QN AUTO: 30 PG (ref 27–33)
MCHC RBC AUTO-ENTMCNC: 32.7 G/DL (ref 32–36)
MCV RBC AUTO: 91.9 FL (ref 80–100)
NONHDLC SERPL-MCNC: 132 MG/DL (CALC)
PLATELET # BLD AUTO: 264 THOUSAND/UL (ref 140–400)
PMV BLD REES-ECKER: 10 FL (ref 7.5–12.5)
POTASSIUM SERPL-SCNC: 4.4 MMOL/L (ref 3.5–5.3)
PROT SERPL-MCNC: 6.8 G/DL (ref 6.1–8.1)
RBC # BLD AUTO: 5.06 MILLION/UL (ref 3.8–5.1)
SODIUM SERPL-SCNC: 139 MMOL/L (ref 135–146)
TRIGL SERPL-MCNC: 178 MG/DL
WBC # BLD AUTO: 5.3 THOUSAND/UL (ref 3.8–10.8)

## 2025-03-11 ENCOUNTER — TREATMENT (OUTPATIENT)
Dept: PHYSICAL THERAPY | Facility: CLINIC | Age: 65
End: 2025-03-11
Payer: MEDICARE

## 2025-03-11 DIAGNOSIS — G89.29 CHRONIC PAIN OF LEFT KNEE: ICD-10-CM

## 2025-03-11 DIAGNOSIS — M25.562 CHRONIC PAIN OF LEFT KNEE: ICD-10-CM

## 2025-03-11 PROCEDURE — 97110 THERAPEUTIC EXERCISES: CPT | Mod: GP

## 2025-03-11 NOTE — PROGRESS NOTES
"Physical Therapy    Physical Therapy Treatment    Patient Name: Mary Coughlin  MRN: [id number]  Today's Date: 3/11/2025    Time Entry:   Time Calculation  Start Time: 1600  Stop Time: 1640  Time Calculation (min): 40 min     PT Therapeutic Procedures Time Entry  Therapeutic Exercise Time Entry: 30              Non-Billable Time  Non-billable co-treatment time: 10  Insurance: Medicare  Certification period: 11/8/24  to 2/6/25  Visit Count: 8    Assessment:  Updated HEP for her to continue at home for right now.     Plan:   Follow up as needed    Current Problem  1. Chronic pain of left knee  Follow Up In Physical Therapy          Subjective    \"Doing okay\"  Precautions   None  Pain   0/10 initially    Objective   Normal appearing gait    Treatments:  Therapeutic Exercise  SLS w/dumbbell pass 5# x 10  Review of current HEP  Pball bridge x 10  Trial of pball bridge w/HS curl x 2   LSLR 3# 2 x 10 L   Leg press # 2 x 10  Leg Press SL 50# 2 x 10 ea  DL HS curl 44# x 10 (5/2)  Ccamb 5.5 plates x 10 retro and forward    OP EDUCATION:       Goals:  Active       PT Problem       Patient will be independent with home exercise program for home maintenance.        Start:  11/08/24    Expected End:  12/08/24            Pt will increase L knee extension to 0 to facilitate full extension during gait.        Start:  11/08/24    Expected End:  12/08/24            The patient will improve score on LEFS by 10 points to indicate decreased pain and increased functional level.         Start:  11/08/24    Expected End:  01/07/25            Pt will report being able to bike or swim for 20 minutes without reactive knee swelling or pain to facilitate progression of activity level.        Start:  11/08/24    Expected End:  01/07/25              "

## 2025-03-21 ENCOUNTER — APPOINTMENT (OUTPATIENT)
Dept: ORTHOPEDIC SURGERY | Facility: CLINIC | Age: 65
End: 2025-03-21
Payer: MEDICARE

## 2025-03-21 DIAGNOSIS — M17.12 PRIMARY OSTEOARTHRITIS OF LEFT KNEE: ICD-10-CM

## 2025-03-21 DIAGNOSIS — M25.462 EFFUSION, LEFT KNEE: ICD-10-CM

## 2025-03-21 PROCEDURE — 99214 OFFICE O/P EST MOD 30 MIN: CPT | Performed by: STUDENT IN AN ORGANIZED HEALTH CARE EDUCATION/TRAINING PROGRAM

## 2025-03-21 NOTE — PROGRESS NOTES
Mary Coughlin  is a 64 y.o. year-old  female. she  is a new patient to our office and presents with a chief complaint of Left  knee pain.  This began in the fall.  She did previously see my partner where she received a steroid injection which helped.  She has been doing physical therapy as well.  She reports however she has persistent mechanical symptoms in the knee that is really holding her back.      Past Medical, Family, and Social History reviewed   Review of Systems  A complete review of systems was conducted, pertinent only to the HPI noted above.  Constitutional: None  Eyes: No additions to above history  Ears, Nose, Throat: No additions to above history  Cardiovascular: No additions to above history  Respiratory: No additions to above history  GI: No additions to above history  : No additions to above history  Skin/Neuro: No additions to above history  Endocrine/Heme/Lymph: No additions to above history  Immunologic: No additions to above history  Psychiatric: No additions to above history  Musculoskeletal: see above    GEN: Alert and Oriented x 3  Constitutional: Well appearing , in no apparent distress.  Skin: No rashes, erythema, or induration around knee    MUSCULOSKELETAL EXAM:     Left KNEE:  ROM: 0/0/130  Effusion: positive  Alignment: [neutral]      Gait: [normal]  Sensation intact bilaterally sural/saphenous/sp/dp/tibial nerve bilaterally  Motor 5/5 knee flexion/extension/foot DF/PF/EHL/FHL bilaterally  Palpable/symmetric DP and PT pulse bilaterally      PATELLAR/EXTENSOR MECHANISM:   KNEE:  Patellar Crepitus: n  Patellar Compression Pain:n  Patellar Apprehension: [no]  Extensor Mechanism: [intact]  Straight Leg Raise: good      LIGAMENTS:  ACL: Lachmans: [negative]  PCL: [stable]  Valgus at 0 degrees: [stable]  Valgus at 30 degrees: [stable]  Varus at 0 degrees: [stable]  Varus at 30 degrees: [stable]    MENISCUS EXAM:  Joint Line Tenderness:medial joint line tenderness  McMurrays:  [negative]  Pain with Deep Flexion: [No]    Xrays independently viewed and interpreted: Mild degenerative changes in the knee    I reviewed with the patient she does have some mild degenerative changes.  Consistent symptoms are consistent with a meniscus tear given her mechanical symptoms we have recommended an MRI.  This has been ordered she will return to review.  All questions were answered she is in agreement with this plan.

## 2025-03-25 ENCOUNTER — APPOINTMENT (OUTPATIENT)
Dept: PHYSICAL THERAPY | Facility: CLINIC | Age: 65
End: 2025-03-25
Payer: MEDICARE

## 2025-03-26 ENCOUNTER — HOSPITAL ENCOUNTER (OUTPATIENT)
Dept: RADIOLOGY | Facility: CLINIC | Age: 65
Discharge: HOME | End: 2025-03-26
Payer: MEDICARE

## 2025-03-26 DIAGNOSIS — M25.462 EFFUSION, LEFT KNEE: ICD-10-CM

## 2025-03-26 PROCEDURE — 73721 MRI JNT OF LWR EXTRE W/O DYE: CPT | Mod: LT

## 2025-03-28 ENCOUNTER — OFFICE VISIT (OUTPATIENT)
Dept: ORTHOPEDIC SURGERY | Facility: CLINIC | Age: 65
End: 2025-03-28
Payer: MEDICARE

## 2025-03-28 DIAGNOSIS — S83.242D OTHER TEAR OF MEDIAL MENISCUS OF LEFT KNEE AS CURRENT INJURY, SUBSEQUENT ENCOUNTER: Primary | ICD-10-CM

## 2025-03-28 PROCEDURE — 99214 OFFICE O/P EST MOD 30 MIN: CPT | Performed by: STUDENT IN AN ORGANIZED HEALTH CARE EDUCATION/TRAINING PROGRAM

## 2025-03-28 NOTE — PROGRESS NOTES
Mary Coughlin  is a 64 y.o. year-old  female. she  is a new patient to our office and presents with a chief complaint of Left  knee pain.  This began in the fall.  She did previously see my partner where she received a steroid injection which helped.  She has been doing physical therapy as well.  She reports however she has persistent mechanical symptoms in the knee that is really holding her back.    Returns to review the results of the MRI    Past Medical, Family, and Social History reviewed   Review of Systems  A complete review of systems was conducted, pertinent only to the HPI noted above.  Constitutional: None  Eyes: No additions to above history  Ears, Nose, Throat: No additions to above history  Cardiovascular: No additions to above history  Respiratory: No additions to above history  GI: No additions to above history  : No additions to above history  Skin/Neuro: No additions to above history  Endocrine/Heme/Lymph: No additions to above history  Immunologic: No additions to above history  Psychiatric: No additions to above history  Musculoskeletal: see above    GEN: Alert and Oriented x 3  Constitutional: Well appearing , in no apparent distress.  Skin: No rashes, erythema, or induration around knee    MUSCULOSKELETAL EXAM:     Left KNEE:  ROM: 0/0/130  Effusion: positive  Alignment: [neutral]      Gait: [normal]  Sensation intact bilaterally sural/saphenous/sp/dp/tibial nerve bilaterally  Motor 5/5 knee flexion/extension/foot DF/PF/EHL/FHL bilaterally  Palpable/symmetric DP and PT pulse bilaterally      PATELLAR/EXTENSOR MECHANISM:   KNEE:  Patellar Crepitus: n  Patellar Compression Pain:n  Patellar Apprehension: [no]  Extensor Mechanism: [intact]  Straight Leg Raise: good      LIGAMENTS:  ACL: Lachmans: [negative]  PCL: [stable]  Valgus at 0 degrees: [stable]  Valgus at 30 degrees: [stable]  Varus at 0 degrees: [stable]  Varus at 30 degrees: [stable]    MENISCUS EXAM:  Joint Line Tenderness:medial  joint line tenderness  McMurrays: [negative]  Pain with Deep Flexion: [No]    Xrays independently viewed and interpreted: Mild degenerative changes in the knee  MRI independently reviewed and interpreted: There is moderate degenerative changes particular in the medial compartment no osseous edema, there is a medial meniscus root tear with extrusion      I did review the results of the MRI which demonstrates a medial meniscus root tear.  We discussed both surgical and nonsurgical treatment.  Reviewed the possibility of an arthroscopy and surgical repair of her medial meniscus root tear.  She already has some moderate degenerative changes in this compartment and reviewed the chance of success at repair is guarded.  We discussed continued nonsurgical treatment in the form of physical therapy intermittent injections.  She is daphne continue with nonsurgical treatment.  Ll questions were answered she is in agreement with this plan.

## 2025-07-10 ENCOUNTER — OFFICE VISIT (OUTPATIENT)
Dept: ORTHOPEDIC SURGERY | Facility: HOSPITAL | Age: 65
End: 2025-07-10
Payer: MEDICARE

## 2025-07-10 DIAGNOSIS — M17.12 PRIMARY OSTEOARTHRITIS OF LEFT KNEE: Primary | ICD-10-CM

## 2025-07-10 PROCEDURE — 99212 OFFICE O/P EST SF 10 MIN: CPT | Performed by: EMERGENCY MEDICINE

## 2025-07-10 PROCEDURE — 99203 OFFICE O/P NEW LOW 30 MIN: CPT | Performed by: EMERGENCY MEDICINE

## 2025-07-10 PROCEDURE — 1159F MED LIST DOCD IN RCRD: CPT | Performed by: EMERGENCY MEDICINE

## 2025-07-10 ASSESSMENT — PAIN - FUNCTIONAL ASSESSMENT: PAIN_FUNCTIONAL_ASSESSMENT: NO/DENIES PAIN

## 2025-07-10 NOTE — PROGRESS NOTES
"Subjective   Patient ID: Mary Coughlin \"magui" is a 64 y.o. female who presents for Pain of the Left Knee.  History of Present Illness  The patient presents for a left knee PRP and BMAC consultation. She was referred by Dr. Tompkins.    She is seeking information about her treatment options, including stem cells. She has undergone physical therapy and has not received any gel injections. She has x-rays and an MRI. She is on Medicare. She reports that her knee issues have been a roller coaster, with pain and swelling that eventually led to non-weightbearing. She used a crutch, elevated her leg, applied ice, and took Advil. After a day of rest, she attempted to stand but experienced difficulty. She sought medical attention the following day. She is concerned about experiencing sudden, shooting pain that could cause her to fall down stairs. She is interested in understanding if her pain is due to a sore spot or if she is causing further damage. She is also curious about the potential benefits of weight loss and maintaining an active lifestyle. She continues to engage in physical therapy, cycling, and swimming. She reports that certain swimming strokes feel unusual and can cause flare-ups, leading her to worry about potential damage. She is interested in understanding the longevity of her knees and the duration of the injection's effects. She has no history of cancer and is not currently taking any medications.    She consulted with Dr. Fields on 09/30/2024, who administered a cortisone injection that provided some relief for at least 6 months. She then consulted with Dr. Tompkins on 03/21/2025 and 03/28/2025, who ordered an MRI revealing a left medial meniscus root tear. The therapist recommended an MRI, but she declined, opting for knee replacement instead. She sought a second opinion from Dr. Tompkins, who confirmed the root tear via MRI. The injection was administered in 11/2024.    All other systems have been reviewed " and are negative for complaint.      Objective     There were no vitals taken for this visit.     Physical Exam  - Musculoskeletal:    - Left knee: No obvious deformity    - Flexion and extension intact with extension at zero and flexion to about 115 degrees    - Negative valgus stress    - Negative varus stress    - Medial joint line tenderness    - Crepitus present with flexion and extension    Imaging:   I have personally reviewed and agree with Radiologist interpretation of previous imaging studies performed prior to this visit.   STUDY:  MRI of the left knee without IV contrast;  3/26/2025 3:40 pm      INDICATION:  Signs/Symptoms:KNEE EFFUSION.      ,M25.462 Effusion, left knee      COMPARISON:  None.      ACCESSION NUMBER(S):  [id number]      ORDERING CLINICIAN:  CK JUAN      TECHNIQUE:  MR imaging of the left knee was obtained  without IV contrast.      FINDINGS:  LIGAMENTS AND TENDONS:  The anterior cruciate ligament and the posterior cruciate ligaments  are intact. The medial collateral ligament is intact. The lateral  collateral ligament, the biceps femoris tendon, the popliteus tendon  and the iliotibial band are intact. The quadriceps tendon and the  patellar tendon are intact.      MENISCI:  There is a radial tear through the posterior root of the medial  meniscus. The posterior horn, body and anterior horn of the medial  meniscus remain intact. The lateral meniscus is intact and without  evidence of tear.      JOINTS:  There is mild tricompartmental cartilage loss with osteophytosis.  This is more pronounced in the patellofemoral compartment there is  subchondral cyst formation in the lateral trochlear and lateral  patellar facet as well. There is a small suprapatellar joint  effusion. No Stevenson's cyst is seen.      OSSEOUS STRUCTURES:  No focal marrow replacing lesions are identified. There is no  fracture.      SOFT TISSUES:  Susceptibility artifact in the pretibial region. Please  correlate  with prior intervention. There is no muscle atrophy or tear.  The common peroneal nerve is intact.      IMPRESSION:  1. Radial tear through the posterior root of the medial meniscus  2. Mild tricompartmental cartilage loss worse in the patellofemoral  compartment. Subchondral cyst formation in the lateral trochlear and  patellar facets.  3. Small effusion          I personally reviewed the images/study and I agree with the findings  as stated. This study was interpreted at [facility], [place], [place].      MACRO:  None      Signed by: Bret Barrera 3/26/2025 4:43 PM      1. Primary osteoarthritis of left knee            Assessment & Plan  1. Left knee pain.  - She has a left medial meniscus root tear and significant patellofemoral arthritis. The success rate of surgical intervention for the root tear is significantly reduced due to the presence of patellofemoral arthritis.  - Various treatment options were discussed, including PRP, BMAC, and stem cells. It was explained that while these treatments can provide temporary relief, they do not promote cartilage regrowth or heal meniscus tears.  - The cost of PRP treatment was discussed, which is $1089 in this office and $711 in [place]. The cost of BMAC treatment was also discussed, which is approximately $6000 and is not covered by insurance. The potential benefits of hyaluronic acid injections or gel injections were also discussed. She was informed that Medicare covers Durolane injections, which are considered the most effective among all the injections.  - The possibility of combining Durolane with PRP was also discussed, as recent studies suggest that this combination can outperform other treatments. She was advised to maintain an active lifestyle and avoid weight gain. A Durolane injection will be administered today, followed by a PRP injection in 2 weeks.    Lico Hess, DO         This medical note was  created with the assistance of artificial intelligence (AI) for documentation purposes. The content has been reviewed and confirmed by the healthcare provider for accuracy and completeness. Patient consented to the use of audio recording and use of AI during their visit.

## 2025-09-05 ENCOUNTER — APPOINTMENT (OUTPATIENT)
Dept: ORTHOPEDIC SURGERY | Facility: CLINIC | Age: 65
End: 2025-09-05
Payer: MEDICARE

## 2025-09-05 ENCOUNTER — HOSPITAL ENCOUNTER (OUTPATIENT)
Dept: RADIOLOGY | Facility: EXTERNAL LOCATION | Age: 65
Discharge: HOME | End: 2025-09-05

## 2026-03-10 ENCOUNTER — APPOINTMENT (OUTPATIENT)
Dept: PRIMARY CARE | Facility: CLINIC | Age: 66
End: 2026-03-10
Payer: MEDICARE